# Patient Record
Sex: FEMALE | Race: WHITE | NOT HISPANIC OR LATINO | Employment: OTHER | URBAN - METROPOLITAN AREA
[De-identification: names, ages, dates, MRNs, and addresses within clinical notes are randomized per-mention and may not be internally consistent; named-entity substitution may affect disease eponyms.]

---

## 2024-02-09 ENCOUNTER — OFFICE VISIT (OUTPATIENT)
Dept: URGENT CARE | Facility: URGENT CARE | Age: 64
End: 2024-02-09
Payer: COMMERCIAL

## 2024-02-09 ENCOUNTER — ANCILLARY PROCEDURE (OUTPATIENT)
Dept: GENERAL RADIOLOGY | Facility: CLINIC | Age: 64
End: 2024-02-09
Attending: STUDENT IN AN ORGANIZED HEALTH CARE EDUCATION/TRAINING PROGRAM
Payer: COMMERCIAL

## 2024-02-09 VITALS
TEMPERATURE: 97 F | HEART RATE: 59 BPM | RESPIRATION RATE: 16 BRPM | SYSTOLIC BLOOD PRESSURE: 175 MMHG | DIASTOLIC BLOOD PRESSURE: 93 MMHG | WEIGHT: 182 LBS | OXYGEN SATURATION: 99 %

## 2024-02-09 DIAGNOSIS — R07.9 CHEST PAIN, UNSPECIFIED TYPE: Primary | ICD-10-CM

## 2024-02-09 DIAGNOSIS — R07.9 CHEST PAIN, UNSPECIFIED TYPE: ICD-10-CM

## 2024-02-09 LAB
ANION GAP SERPL CALCULATED.3IONS-SCNC: 10 MMOL/L (ref 7–15)
BUN SERPL-MCNC: 12 MG/DL (ref 8–23)
CALCIUM SERPL-MCNC: 9.8 MG/DL (ref 8.8–10.2)
CHLORIDE SERPL-SCNC: 103 MMOL/L (ref 98–107)
CREAT SERPL-MCNC: 0.66 MG/DL (ref 0.51–0.95)
DEPRECATED HCO3 PLAS-SCNC: 26 MMOL/L (ref 22–29)
EGFRCR SERPLBLD CKD-EPI 2021: >90 ML/MIN/1.73M2
ERYTHROCYTE [DISTWIDTH] IN BLOOD BY AUTOMATED COUNT: 12.9 % (ref 10–15)
GLUCOSE SERPL-MCNC: 97 MG/DL (ref 70–99)
HCT VFR BLD AUTO: 42.4 % (ref 35–47)
HGB BLD-MCNC: 14.1 G/DL (ref 11.7–15.7)
MCH RBC QN AUTO: 32.5 PG (ref 26.5–33)
MCHC RBC AUTO-ENTMCNC: 33.3 G/DL (ref 31.5–36.5)
MCV RBC AUTO: 98 FL (ref 78–100)
PLATELET # BLD AUTO: 216 10E3/UL (ref 150–450)
POTASSIUM SERPL-SCNC: 4.5 MMOL/L (ref 3.4–5.3)
RBC # BLD AUTO: 4.34 10E6/UL (ref 3.8–5.2)
SODIUM SERPL-SCNC: 139 MMOL/L (ref 135–145)
TSH SERPL DL<=0.005 MIU/L-ACNC: 2.26 UIU/ML (ref 0.3–4.2)
WBC # BLD AUTO: 4 10E3/UL (ref 4–11)

## 2024-02-09 PROCEDURE — 93000 ELECTROCARDIOGRAM COMPLETE: CPT | Performed by: STUDENT IN AN ORGANIZED HEALTH CARE EDUCATION/TRAINING PROGRAM

## 2024-02-09 PROCEDURE — 36415 COLL VENOUS BLD VENIPUNCTURE: CPT | Performed by: STUDENT IN AN ORGANIZED HEALTH CARE EDUCATION/TRAINING PROGRAM

## 2024-02-09 PROCEDURE — 84443 ASSAY THYROID STIM HORMONE: CPT | Performed by: STUDENT IN AN ORGANIZED HEALTH CARE EDUCATION/TRAINING PROGRAM

## 2024-02-09 PROCEDURE — 71046 X-RAY EXAM CHEST 2 VIEWS: CPT | Mod: TC | Performed by: RADIOLOGY

## 2024-02-09 PROCEDURE — 85027 COMPLETE CBC AUTOMATED: CPT | Performed by: STUDENT IN AN ORGANIZED HEALTH CARE EDUCATION/TRAINING PROGRAM

## 2024-02-09 PROCEDURE — 80048 BASIC METABOLIC PNL TOTAL CA: CPT | Performed by: STUDENT IN AN ORGANIZED HEALTH CARE EDUCATION/TRAINING PROGRAM

## 2024-02-09 PROCEDURE — 99204 OFFICE O/P NEW MOD 45 MIN: CPT | Mod: 25 | Performed by: STUDENT IN AN ORGANIZED HEALTH CARE EDUCATION/TRAINING PROGRAM

## 2024-02-09 RX ORDER — ATENOLOL 25 MG/1
1 TABLET ORAL
COMMUNITY
Start: 2024-01-24

## 2024-02-09 RX ORDER — ASPIRIN 81 MG/1
81 TABLET ORAL DAILY
COMMUNITY

## 2024-02-09 RX ORDER — OMEGA-3/DHA/EPA/FISH OIL 60 MG-90MG
CAPSULE ORAL DAILY
COMMUNITY

## 2024-02-09 RX ORDER — ASCORBIC ACID, VITAMIN A PALMITATE, CHOLECALCIFEROL, THIAMINE HYDROCHLORIDE, RIBOFLAVIN 5-PHOSPHATE SODIUM, PYRIDOXINE HYDROCHLORIDE, NIACINAMIDE, DEXPANTHENOL, ALPHA-TOCOPHEROL ACETATE, VITAMIN K1, FOLIC ACID, BIOTIN, CYANOCOBALAMIN 80; 2300; 400; 1.2; 1.4; 1; 17; 5; 7; .2; 140; 20; 1 MG/5ML; [IU]/5ML; [IU]/5ML; MG/5ML; MG/5ML; MG/5ML; MG/5ML; MG/5ML; [IU]/5ML; MG/5ML; UG/5ML; UG/5ML; UG/5ML
INJECTION, SOLUTION INTRAVENOUS
COMMUNITY

## 2024-02-09 RX ORDER — LANOLIN ALCOHOL/MO/W.PET/CERES
1000 CREAM (GRAM) TOPICAL DAILY
COMMUNITY

## 2024-02-09 RX ORDER — CALCIUM CARBONATE/VITAMIN D3 500-10/5ML
LIQUID (ML) ORAL
COMMUNITY

## 2024-02-09 NOTE — PATIENT INSTRUCTIONS
Your EKG and chest Xray look good.   Basic blood panel, electrolytes and thyroid levels are pending - you will be notified if any abnormalities.    Track blood pressures at home. Bring log to PCP appointment.  Continue atenolol as directed.    Go to ER if you develop severe chest pain, pain that's worse with exertion, or shortness of breath.

## 2024-02-09 NOTE — PROGRESS NOTES
ASSESSMENT & PLAN:   Diagnoses and all orders for this visit:  Chest pain, unspecified type  -     EKG 12-lead complete w/read - Clinics  -     PRIMARY CARE FOLLOW-UP SCHEDULING; Future  -     XR Chest 2 Views; Future  -     Basic metabolic panel; Future  -     TSH with free T4 reflex; Future  -     CBC with platelets; Future    Substernal CP x 2 weeks. CP is constant, nonexertional, non-pleuritic. Reassuringly not suspicious for cardiac etiology. Shortness of breath only occurs when heart rate is elevated which only happens overnight while asleep. Vitals are stable, she is hypertensive at 175/93. Exam is normal with no neurologic deficits. EKG NSR with no acute changes. Chest XR negative. CBC is normal. BMP, TSH pending. Continue atenolol as directed. Recommend monitoring BP at home and keeping track to discuss with PCP. She would likely benefit from Zio patch due to occasional elevated heart rate at night. She needs to establish care - referral to family medicine for follow-up. Strict ER precautions discussed.    At the end of the encounter, I discussed results, diagnosis, medications. Discussed red flags for immediate return to clinic/ER, as well as indications for follow up if no improvement. Patient and/or caregiver understood and agreed to plan. Patient was stable for discharge.    Patient Instructions   Your EKG and chest Xray look good.   Basic blood panel, electrolytes and thyroid levels are pending - you will be notified if any abnormalities.    Track blood pressures at home. Bring log to PCP appointment.  Continue atenolol as directed.    Go to ER if you develop severe chest pain, pain that's worse with exertion, or shortness of breath.     ------------------------------------------------------------------------  SUBJECTIVE  History was obtained from patient.    Patient presents with:  Urgent Care  Chest Wall Pain: Constant pressure in chest wall x 2 weeks  Hypertension: Took BP at home 180/108 this  morning    HPI  Deborah D Behnke is a(n) 63 year old female presenting to urgent care for chest pressure x 2 weeks. Located substernal with no radiation. This pressure is constant. Reports elevated heart rate which only occurs overnight while she is sleeping. Feels short of breath when this occurs but otherwise no shortness of breath. No cough, dizziness, lightheaded. She takes atenolol with no recent changes to dosing. No history of diabetes, smoking, MI, stroke. Family history of heart disease when her parents were in their 70s.    Review of Systems    Current Outpatient Medications   Medication Sig Dispense Refill    aspirin 81 MG EC tablet Take 81 mg by mouth daily      atenolol (TENORMIN) 25 MG tablet Take 1 tablet by mouth daily at 2 pm      cyanocobalamin (VITAMIN B-12) 1000 MCG tablet Take 1,000 mcg by mouth daily      fish oil-omega-3 fatty acids 500 MG capsule Take by mouth daily      magnesium oxide 400 MG CAPS       Pediatric Multiple Vitamins (INFUVITE PEDIATRIC) SOLN injection        Problem List:  There are no relevant problems documented for this patient.    Allergies   Allergen Reactions    Tetracyclines & Related Hives and Rash         OBJECTIVE  Vitals:    02/09/24 1007 02/09/24 1011 02/09/24 1100 02/09/24 1103   BP: (!) 190/94 (!) 179/97 (!) 180/96 (!) 175/93   BP Location: Right arm Right arm Right arm Right arm   Patient Position: Sitting Sitting Sitting Sitting   Cuff Size: Adult Regular Adult Regular Adult Large Adult Regular   Pulse: 63 63 59 59   Resp: 16      Temp: 97  F (36.1  C)      TempSrc: Tympanic      SpO2: 99% 99% 99% 99%   Weight: 82.6 kg (182 lb)        Physical Exam   GENERAL: healthy, alert, no acute distress.   PSYCH: mentation appears normal. Normal affect  HEAD: normocephalic, atraumatic.  LUNGS: no increased work of breathing. Clear lung sounds bilaterally. No wheezing, rhonchi, or rales.   CV: regular rate and rhythm. No clicks, murmurs, or rubs.  NEURO: No dysarthria.  PERRL. EOMs intact. Cranial nerves intact and symmetric. Upper extremities sensation intact and symmetric. 5/5 elbow flexion and extension. Lower extremities sensation intact and symmetric. 5/5 knee flexion and extension. 5/5 dorsiflexion and plantar flexion. Negative pronator drift. Normal gait.              EKG Interpretation:      Interpreted by Yamile Khan PA-C  Time reviewed:1030   Symptoms at time of EKG: chest pressure   Rhythm: Normal sinus   Rate: 50-60  Axis: Normal  Ectopy: None  Conduction: Normal  ST Segments/ T Waves: No ST-T wave changes and No acute ischemic changes  Q Waves: None  Comparison to prior: No old EKG available  Clinical Impression: normal EKG    Results for orders placed or performed in visit on 02/09/24   CBC with platelets     Status: Normal   Result Value Ref Range    WBC Count 4.0 4.0 - 11.0 10e3/uL    RBC Count 4.34 3.80 - 5.20 10e6/uL    Hemoglobin 14.1 11.7 - 15.7 g/dL    Hematocrit 42.4 35.0 - 47.0 %    MCV 98 78 - 100 fL    MCH 32.5 26.5 - 33.0 pg    MCHC 33.3 31.5 - 36.5 g/dL    RDW 12.9 10.0 - 15.0 %    Platelet Count 216 150 - 450 10e3/uL   Results for orders placed or performed in visit on 02/09/24   XR Chest 2 Views     Status: None (Preliminary result)    Narrative    CHEST TWO VIEWS  2/9/2024 11:13 AM     HISTORY:  Substernal chest pain, unspecified type.    COMPARISON: None.       Impression    IMPRESSION: Benign granulomatous change. There are no acute  infiltrates. The cardiac silhouette is not enlarged. Pulmonary  vasculature is unremarkable.